# Patient Record
Sex: MALE | Race: WHITE
[De-identification: names, ages, dates, MRNs, and addresses within clinical notes are randomized per-mention and may not be internally consistent; named-entity substitution may affect disease eponyms.]

---

## 2017-09-02 ENCOUNTER — HOSPITAL ENCOUNTER (EMERGENCY)
Dept: HOSPITAL 83 - ED | Age: 32
Discharge: HOME | End: 2017-09-02
Payer: SELF-PAY

## 2017-09-02 VITALS — HEIGHT: 60 IN

## 2017-09-02 VITALS — SYSTOLIC BLOOD PRESSURE: 150 MMHG | DIASTOLIC BLOOD PRESSURE: 100 MMHG

## 2017-09-02 DIAGNOSIS — F17.200: ICD-10-CM

## 2017-09-02 DIAGNOSIS — L02.11: Primary | ICD-10-CM

## 2017-09-02 DIAGNOSIS — R03.0: ICD-10-CM

## 2018-09-19 ENCOUNTER — HOSPITAL ENCOUNTER (EMERGENCY)
Dept: HOSPITAL 83 - ED | Age: 33
Discharge: HOME | End: 2018-09-19
Payer: SELF-PAY

## 2018-09-19 VITALS — DIASTOLIC BLOOD PRESSURE: 88 MMHG | SYSTOLIC BLOOD PRESSURE: 145 MMHG

## 2018-09-19 VITALS — BODY MASS INDEX: 29.12 KG/M2 | WEIGHT: 215 LBS | HEIGHT: 71.97 IN

## 2018-09-19 DIAGNOSIS — H60.92: Primary | ICD-10-CM

## 2018-09-19 DIAGNOSIS — F17.200: ICD-10-CM

## 2019-01-06 ENCOUNTER — HOSPITAL ENCOUNTER (INPATIENT)
Dept: HOSPITAL 83 - ED | Age: 34
LOS: 1 days | Discharge: HOME | DRG: 309 | End: 2019-01-07
Attending: INTERNAL MEDICINE | Admitting: INTERNAL MEDICINE
Payer: COMMERCIAL

## 2019-01-06 VITALS — DIASTOLIC BLOOD PRESSURE: 81 MMHG

## 2019-01-06 VITALS — HEIGHT: 72 IN | WEIGHT: 201.13 LBS | BODY MASS INDEX: 27.24 KG/M2

## 2019-01-06 VITALS — DIASTOLIC BLOOD PRESSURE: 90 MMHG

## 2019-01-06 VITALS — DIASTOLIC BLOOD PRESSURE: 63 MMHG

## 2019-01-06 VITALS — SYSTOLIC BLOOD PRESSURE: 130 MMHG | DIASTOLIC BLOOD PRESSURE: 78 MMHG

## 2019-01-06 VITALS — SYSTOLIC BLOOD PRESSURE: 140 MMHG | DIASTOLIC BLOOD PRESSURE: 90 MMHG

## 2019-01-06 VITALS — DIASTOLIC BLOOD PRESSURE: 91 MMHG

## 2019-01-06 VITALS — DIASTOLIC BLOOD PRESSURE: 100 MMHG

## 2019-01-06 VITALS — DIASTOLIC BLOOD PRESSURE: 88 MMHG | SYSTOLIC BLOOD PRESSURE: 142 MMHG

## 2019-01-06 VITALS — DIASTOLIC BLOOD PRESSURE: 98 MMHG

## 2019-01-06 VITALS — DIASTOLIC BLOOD PRESSURE: 76 MMHG | SYSTOLIC BLOOD PRESSURE: 123 MMHG

## 2019-01-06 DIAGNOSIS — Z82.49: ICD-10-CM

## 2019-01-06 DIAGNOSIS — E87.2: ICD-10-CM

## 2019-01-06 DIAGNOSIS — Z71.6: ICD-10-CM

## 2019-01-06 DIAGNOSIS — R06.82: ICD-10-CM

## 2019-01-06 DIAGNOSIS — R00.0: Primary | ICD-10-CM

## 2019-01-06 DIAGNOSIS — Z83.6: ICD-10-CM

## 2019-01-06 DIAGNOSIS — Z82.61: ICD-10-CM

## 2019-01-06 DIAGNOSIS — I45.81: ICD-10-CM

## 2019-01-06 DIAGNOSIS — Z83.3: ICD-10-CM

## 2019-01-06 DIAGNOSIS — E87.6: ICD-10-CM

## 2019-01-06 DIAGNOSIS — E87.3: ICD-10-CM

## 2019-01-06 DIAGNOSIS — R07.89: ICD-10-CM

## 2019-01-06 DIAGNOSIS — F17.200: ICD-10-CM

## 2019-01-06 LAB
ALBUMIN SERPL-MCNC: 4.1 GM/DL (ref 3.1–4.5)
ALP SERPL-CCNC: 107 U/L (ref 45–117)
ALT SERPL W P-5'-P-CCNC: 49 U/L (ref 12–78)
AMPHETAMINES UR QL SCN: < 1000
APAP SERPL-MCNC: < 5 UG/ML (ref 10–30)
APPEARANCE UR: (no result)
APTT PPP: 24 SECONDS (ref 20.8–31.5)
AST SERPL-CCNC: 22 IU/L (ref 3–35)
BACTERIA #/AREA URNS HPF: (no result) /[HPF]
BARBITURATES UR QL SCN: < 200
BASE EXCESS BLDA CALC-SCNC: -1.7 MMOL/L (ref -2–2)
BASOPHILS # BLD AUTO: 0.1 10*3/UL (ref 0–0.1)
BASOPHILS NFR BLD AUTO: 0.5 % (ref 0–1)
BENZODIAZ UR QL SCN: < 200
BILIRUB UR QL STRIP: NEGATIVE
BUN SERPL-MCNC: 13 MG/DL (ref 7–24)
BZE UR QL SCN: < 300
CANNABINOIDS UR QL SCN: < 50
CASTS URNS QL MICRO: (no result)
CHLORIDE SERPL-SCNC: 107 MMOL/L (ref 98–107)
COLOR UR: YELLOW
CREAT SERPL-MCNC: 1.09 MG/DL (ref 0.7–1.3)
EOSINOPHIL # BLD AUTO: 0.3 10*3/UL (ref 0–0.4)
EOSINOPHIL # BLD AUTO: 3.5 % (ref 1–4)
EPI CELLS #/AREA URNS HPF: (no result) /[HPF]
ERYTHROCYTE [DISTWIDTH] IN BLOOD BY AUTOMATED COUNT: 12.6 % (ref 0–14.5)
ETHANOL SERPL-MCNC: < 3 MG/DL (ref ?–3)
GLUCOSE UR QL: NEGATIVE
HCO3 BLDA-SCNC: 19.3 MMOL/L (ref 22–26)
HCT VFR BLD AUTO: 46.4 % (ref 42–52)
HGB BLD-MCNC: 16 G/DL (ref 14–18)
HGB UR QL STRIP: NEGATIVE
INR BLD: 0.9 (ref 2–3.5)
KETONES UR QL STRIP: NEGATIVE
LEUKOCYTE ESTERASE UR QL STRIP: NEGATIVE
LYMPHOCYTES # BLD AUTO: 2.7 10*3/UL (ref 1.3–4.4)
LYMPHOCYTES NFR BLD AUTO: 27.3 % (ref 27–41)
MCH RBC QN AUTO: 32.3 PG (ref 27–31)
MCHC RBC AUTO-ENTMCNC: 34.5 G/DL (ref 33–37)
MCV RBC AUTO: 93.7 FL (ref 80–94)
METHADONE UR QL SCN: < 300
MONOCYTES # BLD AUTO: 0.5 10*3/UL (ref 0.1–1)
MONOCYTES NFR BLD MANUAL: 5.3 % (ref 3–9)
NEUT #: 6.1 10*3/UL (ref 2.3–7.9)
NEUT %: 63.3 % (ref 47–73)
NITRITE UR QL STRIP: NEGATIVE
NRBC BLD QL AUTO: 0 10*3/UL (ref 0–0)
OPIATES UR QL SCN: < 300
PCO2 BLDA: 24.6 MMHG (ref 35–45)
PCP UR QL SCN: <  25
PH BLDA: 7.5 [PH] (ref 7.35–7.45)
PH UR STRIP: 6 [PH] (ref 5–9)
PLATELET # BLD AUTO: 219 10*3/UL (ref 130–400)
PMV BLD AUTO: 10.3 FL (ref 9.6–12.3)
PO2 BLDA: 168 MMHG (ref 80–90)
POTASSIUM SERPL-SCNC: 3 MMOL/L (ref 3.5–5.1)
PROT SERPL-MCNC: 7.7 GM/DL (ref 6.4–8.2)
RBC # BLD AUTO: 4.95 10*6/UL (ref 4.5–5.9)
RBC #/AREA URNS HPF: (no result) RBC/HPF (ref 0–2)
SAO2 % BLDA: 98.5 % (ref 95–97)
SODIUM SERPL-SCNC: 139 MMOL/L (ref 136–145)
SP GR UR: 1.02 (ref 1–1.03)
TROPONIN I SERPL-MCNC: < 0.015 NG/ML (ref ?–0.04)
UROBILINOGEN UR STRIP-MCNC: 0.2 E.U./DL (ref 0.2–1)
WBC #/AREA URNS HPF: (no result) WBC/HPF (ref 0–5)
WBC NRBC COR # BLD AUTO: 9.7 10*3/UL (ref 4.8–10.8)

## 2019-01-06 NOTE — NUR
A 33, admitted to ICCU, under the
services of ARTEM Rae DO with a diagnosis of PROLONGED QT
INTERVAL,TACHYCARDIA.
Chief complaint is PALPITATIONS, INTERMIT CHEST PAIN.
Patient arrived via stretcher from ER.
Monitor applied. Initial assessment completed.
Vital signs taken and recorded.
ARTEM RAE DO notified of admission to the unit.
Orders received.
See assessment for past medical history, medications
and allergies.
Patient and/or family oriented to unit. [facility]
visitation policy reviewed.
Clothing/patient valuable form completed.
 
JOHNNA SIERRA

## 2019-01-06 NOTE — EKG
[place], [place]
 
                               ELECTROCARDIOGRAM REPORT
 
        NAME: ILIA GASPAR                     ACCT #: [id number]  
        UNIT #: [id number]                        ROOM: [facility]    
        DOCTOR: SHERIDAN DRAFT REPORT          BIRTHDATE: [birth year]
 
 
 

 
 
                           [facility]
                                       
Test Date:    2019               Test Time:    04:14:09
Pat Name:     ILIA GASPAR             Department:   
Patient ID:   [id number]             Room:         [facility]
Gender:       M                        Technician:   Charity Marquez
:          1985               Requested By: BERKLEY HOPE
Order Number: [id number]      Reading MD:   Nathan Lee MD
                                 Measurements
Intervals                              Axis          
Rate:         126                      P:            57
FL:           130                      QRS:          91
QRSD:         98                       T:            23
QT:           281                                    
QTc:          418                                    
                           Interpretive Statements
Sinus tachycardia
Borderline right axis deviation
Poor precordial R-wave progression
 
Electronically Signed On 2019 10:08:58 PST by Nathan Lee MD
 
CM:EKGRPT:ELECTROCARDIOGRAM REPORT
 
D: 19 0414
T: 19 1008
    
BERKLEY ALICEA DRAFT REPORT         
BERKLEY HOPE DO

## 2019-01-06 NOTE — PR
[place], [place]
 
                                      PROGRESS NOTE
 
        NAME: ILIA GASPAR                    [facility] #: [id number]  
        UNIT #: [id number]                       ROOM: [facility]    
        DOCTOR: NAJMA FERNANDEZ MD            BIRTHDATE: [birth year]
 
 
DOS: 01/07/2019
 
CARDIOLOGY PROGRESS NOTE
 
SUBJECTIVE:  The patient was seen in the Cardiology Department today,
01/07/2019, prior to his stress test.  He is a 33-year-old man who came in to
the hospital because of palpitations and tachycardia.  He has ruled out for
myocardial infarction.  Since he was admitted, he was found to have low
potassium and this has been replaced.  Serial cardiac biomarkers have been
normal and his electrocardiogram showed no acute changes.
 
PHYSICAL EXAMINATION:  On exam today, his pulse is 92 and regular, blood
pressure is 113/77.  He is afebrile.  He weighs 91.2 kg and has a body mass
index 27.3.  HEENT, normocephalic and atraumatic.  Extraocular muscles are
intact.  Sclerae are clear.  Pupils are equal, round and react to light.  The
oral mucosa is moist.  Tongue is midline.  His neck is supple.  He has no
jugular distention.  Carotids are full.  There are no bruits.  He has no neck or
supraclavicular masses and no thyromegaly.  Respirations are unlabored.  His
chest is clear to auscultation and percussion.  He has no presacral edema or
chest wall tenderness.  His heart has a regular rhythm without murmurs, rubs or
gallops.  Abdomen is benign.  Extremities showed no edema.
 
LABORATORY DATA:  His TSH was normal this morning at 1.250, free T4 is normal at
0.86.
 
IMPRESSIONS:
1.  Sinus tachycardia.
2.  Atypical chest pain with increased cardiac awareness.
 
PLAN:  We will proceed with an exercise stress test today along with an
echocardiogram.  If those are normal, then no other cardiac workup would be
indicated.  The patient certainly does have resting tachycardia, which may be
due to inappropriate sinus tachycardia.  Anxiety probably does contribute to
this.  A low-dose beta blocker may be helpful for his symptoms.  I did discuss
this with him at length and he has requested that we start him on a beta-blocker
therapy.  I will prescribe metoprolol succinate 25 mg per day and titrate up if
need be.
 
Further recommendations depend upon the results of his stress test and echo.
 
I thank the hospitalist physicians for asking our advice regarding his care.
 
 
 
 
                              [place], [place]
 
                                      PROGRESS NOTE
 
        NAME: ILIA GASPAR                    ACCT #: [id number]  
        UNIT #: [id number]                       ROOM: [facility]    
        DOCTOR: NAJMA FERNANDEZ MD            BIRTHDATE: [birth year]
 
 
_________________________________
NAJMA FERNANDEZ MD
 
CM:PNTRANS
 
D: 01/07/19 1257                 
T: 01/07/19 2100
             
                                                            
NAJMA FERNANDEZ MD            
                                                            
01/08/19
1000
                              
interface

## 2019-01-06 NOTE — NUR
PT HAD A PM SNACK OF PEANUT BUTTER WITH CRACKERS AND GINGER ALE.  HE IS
WITHOUT COMPLAINTS AND IS REMINDED OF NPO AFTER MIDNIGHT ORDER FOR TESTING
THIS COMING AM.  HE VERBALIZES UNDERSTANDING.

## 2019-01-06 NOTE — EKG
[place], [place]
 
                               ELECTROCARDIOGRAM REPORT
 
        NAME: ILIA GASPAR                     ACCT #: [id number]  
        UNIT #: [id number]                        ROOM: [facility]    
        DOCTOR: SHERIDAN DRAFT REPORT          BIRTHDATE: [birth year]
 
 
 

 
 
                           [facility]
                                       
Test Date:    2019               Test Time:    10:20:28
Pat Name:     ILIA GASPAR             Department:   
Patient ID:   [id number]             Room:         [facility]
Gender:       M                        Technician:   Enedina Kruse
:          1985               Requested By: BERKLEY HOPE
Order Number: [id number]      Reading MD:   Nathan Lee MD
                                 Measurements
Intervals                              Axis          
Rate:         84                       P:            20
FL:           141                      QRS:          79
QRSD:         91                       T:            27
QT:           351                                    
QTc:          415                                    
                           Interpretive Statements
Sinus rhythm
ST elev, probable normal early repol pattern
Compared to earlier ECG this date, rate is slower
 
Electronically Signed On 2019 10:11:33 PST by Nathan Lee MD
 
CM:EKGRPT:ELECTROCARDIOGRAM REPORT
 
D: 19 1020
T: 19 1011
    
BERKLEY ALICEA DRAFT REPORT         
BERKLEY HOPE DO

## 2019-01-06 NOTE — CON
[place], [place]
 
                                 REPORT OF CONSULTATION
 
        NAME: ILIA GASPAR                      ACCT #: [id number]  
        UNIT #: [id number]                         ROOM: [facility]    
        DOCTOR: NAJMA FERNANDEZ MD              BIRTHDATE: [birth year]
 
 
DOS: 01/06/2019
 
CARDIOLOGY CONSULTATION
 
REASON FOR CONSULTATION:  Palpitations and tachycardia.
 
HISTORY OF PRESENT ILLNESS:  The patient is a 33-year-old man who has no
previous history of heart disease.  He states that for the last 2 months, he has
noticed that his heartbeat is fast on occasion.  It began gradually.  He has
noticed this can happen at rest or after going to the bathroom.  He has felt
lightheaded, but has not had any syncope.  He denies any recent weight change,
focal weakness, or peripheral edema.  Last evening, he was playing videogames
when he had to go to the bathroom.  He moved his bowels and then when he got up,
his heart began to race and he felt tightness in his chest radiating into his
back and into his shoulders.  His arms felt heavy and his hands felt tingly. 
He, therefore, came into the Emergency Room.  He was noted to be in sinus
tachycardia.  The initial EKG was interpreted by the computer as showing a
prolonged QT interval; however, this was an error and when I remeasured the QT,
it was within normal limits with a QTc of about 415.  He was noted to be
hypokalemic and his potassium was replaced.  He now feels somewhat better, but
his heart rate is still rapid on occasion.
 
PAST MEDICAL HISTORY:  Essentially unremarkable.  He specifically denies history
of hypertension, diabetes, myocardial infarction, stroke, heart murmur or
rheumatic fever.  He has no history of anemia or blood loss.  He has no history
of fevers.  He is on no prescribed medications and denies the use of any illegal
substances at this time.
 
REVIEW OF SYSTEMS:  The patient denies diplopia or loss of vision.  He has had
some lightheadedness, but denies syncope.  He denies focal weakness.  He denies
seizures.  He denies fevers, chills, sweats or recent weight change.  He denies
heat or cold intolerance.  He denies nausea or vomiting.  He denies hemoptysis
or hematemesis.  He denies cough, fevers or chills.  He denies change in bowel
or bladder habits.  He denies blood in the stools or urine.  He denies any easy
bruising or abnormal bleeding.  He has not had any skin rashes.  He denies any
peripheral edema and denies any history of phlebitis or leg swelling.  He states
that his weight has increased about 50 pounds in the last 3-4 years.  He
attributes this to his job.  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
times without much activity.  He denies any peripheral edema.  The remainder of
review of systems is negative except as noted above.
 
FAMILY HISTORY:  There is diabetes and heart disease in grandparents, but his
parents are alive and well.  His mother does have osteoporosis and rheumatoid
arthritis, but there is no family history of early coronary disease,
hypertension or heart murmur.  He has a brother who is alive and well.
 
SOCIAL HISTORY:  The patient does smoke under a pack of cigarettes a day.  He
drinks alcohol, maybe one day a month.  He does not currently take any illegal
medications or drugs.  He works as a dealer in a [employer].
 
 
                              [place], [place]
 
                                 REPORT OF CONSULTATION
 
        NAME: ILIA GASPAR                      ACCT #: [id number]  
        UNIT #: [id number]                         ROOM: [facility]    
        DOCTOR: NAJMA FERNANDEZ MD              BIRTHDATE: [birth year]
 
 
PHYSICAL EXAMINATION:
GENERAL:  The patient is a well-nourished white male who is awake, alert and
oriented.
VITAL SIGNS:  Pulse is 92 and regular.  Blood pressure is 123/81.  He is
afebrile.  He weighs 91.2 kg and has a body mass index of 27.3.
HEENT:  Normocephalic and atraumatic.  Extraocular muscles are intact.  Sclerae
are clear.  Pupils are equal, round and reactive to light.  The oral mucosa is
moist.  Tongue is midline.
NECK:  Supple.  He has no jugular distention.  Carotids are full.  I heard no
bruits.  He had no neck or supraclavicular masses and no thyromegaly.
LUNGS:  Respirations are unlabored.
CHEST:  Clear to auscultation and percussion.  He has no presacral edema or
chest wall tenderness.
CARDIOVASCULAR:  His heart has a regular rhythm without murmurs, rubs or
gallops.  The PMI is not displaced.  There is no precordial heave, lift, or
thrill.
ABDOMEN:  Soft and normally active without masses, organomegaly or bruits.
EXTREMITIES:  Showed no clubbing, cyanosis or edema.  Peripheral pulses are
easily palpated in the feet bilaterally.  He has no Homans sign.
 
LABORATORY DATA:  I reviewed his electrocardiograms from admission.  He did have
sinus tachycardia, but no other arrhythmias.  His corrected QT interval on
admission was 415 and the elevated number initially reported was an error.  He
had no diagnostic ST or T-wave changes.  Chest x-ray was unremarkable and showed
normal cardiac silhouette.  Hemoglobin is 16, hematocrit 46.4.  There are 9700
white cells, 219,000 platelets.  ABGs showed a pH of 7.505, pCO2 of 24.6, pO2 of
168 and a bicarbonate of 19.3.  INR is 0.9.  Lactic acid at baseline was
elevated at 3, but upon repeat was 1.7.  Serial troponin levels have been
normal.  Sodium is 139, potassium was 3.0 on admission, chloride 107, CO2 of 21,
BUN 13, creatinine 1.09.  Magnesium was normal at 2.0.  Liver tests were all
normal.  TSH was ordered, but is not yet available.
 
The patient's aspirin (salicylate) level was low at 2.4.  Acetaminophen was low
at 5.0.  Urine drug screen was negative for all drugs of abuse.  Alcohol level
was unmeasurable.
 
IMPRESSIONS:
1.  Sinus tachycardia.  Typically, the heart is responding to some external
influence that causes this rhythm.  The patient does admit to consuming
excessive amounts of caffeine, but I see no other external cause thus far for
the tachycardia.  Specifically, he does not appear to be dehydrated, anemic or
infected.  Thyroid studies are, however, pending.
2.  Atypical chest pain, most likely due to his palpitations and increased
cardiac awareness.
3.  Report of prolonged QT interval.  This was an error and the patient does not
have QT prolongation.
 
PLAN:  The patient's potassium is being replaced.  We will continue to watch him
on a monitor, although he may move out of the Intensive Care Unit from my
perspective.  We will be getting an echocardiogram to rule out structural heart
 
                              [place], [place]
 
                                 REPORT OF CONSULTATION
 
        NAME: ILIA GASPAR                      ACCT #: [id number]  
        UNIT #: [id number]                         ROOM: [facility]    
        DOCTOR: NAJMA FERNANDEZ MD              BIRTHDATE: [birth year]
 
 
disease and an exercise myocardial perfusion study in order to rule out coronary
artery disease as a cause for his chest discomfort.  Further recommendations
will depend upon those studies.  We will also wait for the result of his thyroid
indices.
 
I thank the hospitalist physicians for asking our advice regarding the patient's
care.
 
 
 
_________________________________
NAJMA FERNANDEZ MD
 
CM:CONSTR:REPORT OF CONSULTATION
 
D: 01/06/19 1353   T: 01/07/19 01/07/19     1115                                          interface

## 2019-01-06 NOTE — EKG
[place], [place]
 
                               ELECTROCARDIOGRAM REPORT
 
        NAME: ILIA GASPAR                     ACCT #: [id number]  
        UNIT #: [id number]                        ROOM: [facility]    
        DOCTOR: SHERIDAN DRAFT REPORT          BIRTHDATE: [birth year]
 
 
 

 
 
                           [facility]
                                       
Test Date:    2019               Test Time:    06:54:35
Pat Name:     ILIA GASPAR             Department:   
Patient ID:   [id number]             Room:         [facility]
Gender:       M                        Technician:   Enedina Kruse
:          1985               Requested By: BERKLEY HOPE
Order Number: [id number]      Reading MD:   Nathan Lee MD
                                 Measurements
Intervals                              Axis          
Rate:         98                       P:            51
WA:           149                      QRS:          79
QRSD:         90                       T:            13
QT:           324                                    
QTc:          414                                    
                           Interpretive Statements
Sinus rhythm
Compared to earlier ECG this date, rate is slower
 
 
Electronically Signed On 2019 10:10:05 PST by Nathan Lee MD
 
CM:EKGRPT:ELECTROCARDIOGRAM REPORT
 
D: 19 0654
T: 19 1010
    
BERKLEY ALICEA DRAFT REPORT         
BERKLEY HOPE DO

## 2019-01-06 NOTE — NUR
PT BATHED SELF, WASHED HAIR, AND CHANGED CLOTHES.  NO COMPLAINTS OFFERED. NO
CARDIAC CHANGES ON MONITOR.  VSS.

## 2019-01-06 NOTE — NUR
THE PT IS CALM AT THIS TIME.  C/O FEELING WEAK AND TIRED.   HIS FAMILY IS AT
THE BEDISDE.  CALL LIGHT IS WITHIN REACH.   WILL CONTINUE TO MONITOR

## 2019-01-06 NOTE — NUR
[employer] [employer] WAS CALLED THEY ADVISED TO CHECK AN ASPRIN AND TYLENOL LEVEL. TO
DO THIS Q2H TO ESTABLISH A PEAK AND DECLINE LEVEL.  IF THE ASA COMES BACK AT 30
DECIMETERS OR HIGHER BICARP WILL NEED TO BE GIVEN.  
DR HOPE WAS NOTIFIED

## 2019-01-07 VITALS — SYSTOLIC BLOOD PRESSURE: 112 MMHG | DIASTOLIC BLOOD PRESSURE: 76 MMHG

## 2019-01-07 VITALS — DIASTOLIC BLOOD PRESSURE: 77 MMHG | SYSTOLIC BLOOD PRESSURE: 122 MMHG

## 2019-01-07 VITALS — DIASTOLIC BLOOD PRESSURE: 77 MMHG

## 2019-01-07 LAB
25(OH)D3 SERPL-MCNC: 34.5 NG/ML (ref 30–100)
BASOPHILS # BLD AUTO: 0 10*3/UL (ref 0–0.1)
BASOPHILS NFR BLD AUTO: 0.5 % (ref 0–1)
BUN SERPL-MCNC: 13 MG/DL (ref 7–24)
CHLORIDE SERPL-SCNC: 110 MMOL/L (ref 98–107)
CHOLEST SERPL-MCNC: 198 MG/DL (ref ?–200)
CREAT SERPL-MCNC: 1.11 MG/DL (ref 0.7–1.3)
EOSINOPHIL # BLD AUTO: 0.3 10*3/UL (ref 0–0.4)
EOSINOPHIL # BLD AUTO: 4.2 % (ref 1–4)
ERYTHROCYTE [DISTWIDTH] IN BLOOD BY AUTOMATED COUNT: 13 % (ref 0–14.5)
HCT VFR BLD AUTO: 45.3 % (ref 42–52)
HDLC SERPL-MCNC: 26 MG/DL (ref 40–60)
HGB BLD-MCNC: 14.9 G/DL (ref 14–18)
LDLC SERPL DIRECT ASSAY-MCNC: 125 MG/DL (ref 9–159)
LV EF: 71 %
LVEF MODALITY: NORMAL
LYMPHOCYTES # BLD AUTO: 2.8 10*3/UL (ref 1.3–4.4)
LYMPHOCYTES NFR BLD AUTO: 34.2 % (ref 27–41)
MCH RBC QN AUTO: 32.1 PG (ref 27–31)
MCHC RBC AUTO-ENTMCNC: 32.9 G/DL (ref 33–37)
MCV RBC AUTO: 97.6 FL (ref 80–94)
MONOCYTES # BLD AUTO: 0.4 10*3/UL (ref 0.1–1)
MONOCYTES NFR BLD MANUAL: 4.9 % (ref 3–9)
NEUT #: 4.5 10*3/UL (ref 2.3–7.9)
NEUT %: 56 % (ref 47–73)
NRBC BLD QL AUTO: 0 % (ref 0–0)
PHOSPHATE SERPL-MCNC: 4.5 MG/DL (ref 2.5–4.9)
PLATELET # BLD AUTO: 190 10*3/UL (ref 130–400)
PMV BLD AUTO: 10.3 FL (ref 9.6–12.3)
POTASSIUM SERPL-SCNC: 4.4 MMOL/L (ref 3.5–5.1)
RBC # BLD AUTO: 4.64 10*6/UL (ref 4.5–5.9)
SODIUM SERPL-SCNC: 143 MMOL/L (ref 136–145)
T4 FREE SERPL-MCNC: 0.86 NG/DL (ref 0.76–1.46)
TRIGL SERPL-MCNC: 237 MG/DL (ref ?–150)
TSH SERPL DL<=0.005 MIU/L-ACNC: 1.25 UIU/ML (ref 0.36–4.75)
VITAMIN B12: 668 PG/ML (ref 247–911)
VLDLC SERPL CALC-MCNC: 47 MG/DL (ref 6–40)
WBC NRBC COR # BLD AUTO: 8.1 10*3/UL (ref 4.8–10.8)

## 2019-01-07 NOTE — NUR
in to talk to patient.
Patient states lives at home with his girlfriend and his mother.
There are 14 steps in the home.
Physician: no family physician
Pharmacy: [facility]
Home health services: none
Patient's level of ADLs: INDEPENDENT
Patient has working utilities: yes
DME: none
Follow-up physician's appointment after d/c: will be made by the hospitalist
nurse director upon discharge
Does patient want to access PORTAL?: no
Discharge plan discussed with patient. He lives at home with his girlfriend and
his mother. He is independent in his ADLs and ambulation. Discussed home health
care services and he denies any home needs at this time. When medically stable
he will be discharged to home.
 
SHADY PRASAD

## 2019-01-07 NOTE — NUR
INFORMED CONSENT SIGNED FOR CARDIOLYTE STRESS TEST WITH DR. FERNANDEZ.
RESTING EKG NSR, HR 92, /80.  COMPLETED 6:15 OF A STANDARD NEIL
PROTOCOL COMPLETING 3:15 STAGE II WHICH WAS DECREASED TO 2% GRADE AT 5:15
AFTER INJECTION OF CARDIOLYTE.  PEAK HEART RATE  ACHIEVED WHICH IS 88%
PREDICTED MAXIMUM AND A PEAK BP /62.  TEST TERMINATED D/T FATIGUE.  HAS
A FAIR EXERCISE TOLERANCE.  NO ARRHYTHMIAS NOTED WITH NONDIAGNOSTIC ST CHANGES
PRESENT.  LAST RECOVERY , /78.  WAITING NUCLEAR SCANNING IN STABLE
CONDITION.

## 2019-01-15 ENCOUNTER — HOSPITAL ENCOUNTER (OUTPATIENT)
Dept: HOSPITAL 83 - RESCLI | Age: 34
Discharge: HOME | End: 2019-01-15
Attending: INTERNAL MEDICINE
Payer: SELF-PAY

## 2019-01-15 DIAGNOSIS — Z71.6: ICD-10-CM

## 2019-01-15 DIAGNOSIS — F41.1: Primary | ICD-10-CM

## 2019-01-15 DIAGNOSIS — Z76.89: ICD-10-CM

## 2019-01-15 DIAGNOSIS — R00.0: ICD-10-CM

## 2019-01-15 DIAGNOSIS — F17.210: ICD-10-CM

## 2019-01-17 ENCOUNTER — HOSPITAL ENCOUNTER (EMERGENCY)
Dept: HOSPITAL 83 - ED | Age: 34
Discharge: HOME | End: 2019-01-17
Payer: SELF-PAY

## 2019-01-17 VITALS — SYSTOLIC BLOOD PRESSURE: 162 MMHG | DIASTOLIC BLOOD PRESSURE: 88 MMHG

## 2019-01-17 VITALS — HEIGHT: 60 IN

## 2019-01-17 DIAGNOSIS — F41.0: Primary | ICD-10-CM

## 2019-01-17 DIAGNOSIS — Z79.899: ICD-10-CM

## 2019-01-17 DIAGNOSIS — F17.200: ICD-10-CM

## 2019-01-17 LAB
ALBUMIN SERPL-MCNC: 4.3 GM/DL (ref 3.1–4.5)
ALP SERPL-CCNC: 107 U/L (ref 45–117)
ALT SERPL W P-5'-P-CCNC: 46 U/L (ref 12–78)
AST SERPL-CCNC: 17 IU/L (ref 3–35)
BASOPHILS # BLD AUTO: 0 10*3/UL (ref 0–0.1)
BASOPHILS NFR BLD AUTO: 0.3 % (ref 0–1)
BUN SERPL-MCNC: 13 MG/DL (ref 7–24)
CHLORIDE SERPL-SCNC: 104 MMOL/L (ref 98–107)
CREAT SERPL-MCNC: 1.03 MG/DL (ref 0.7–1.3)
EOSINOPHIL # BLD AUTO: 0.2 10*3/UL (ref 0–0.4)
EOSINOPHIL # BLD AUTO: 2.1 % (ref 1–4)
ERYTHROCYTE [DISTWIDTH] IN BLOOD BY AUTOMATED COUNT: 12.2 % (ref 0–14.5)
HCT VFR BLD AUTO: 47.6 % (ref 42–52)
HGB BLD-MCNC: 16.5 G/DL (ref 14–18)
LYMPHOCYTES # BLD AUTO: 1.7 10*3/UL (ref 1.3–4.4)
LYMPHOCYTES NFR BLD AUTO: 14.3 % (ref 27–41)
MCH RBC QN AUTO: 32 PG (ref 27–31)
MCHC RBC AUTO-ENTMCNC: 34.7 G/DL (ref 33–37)
MCV RBC AUTO: 92.2 FL (ref 80–94)
MONOCYTES # BLD AUTO: 0.4 10*3/UL (ref 0.1–1)
MONOCYTES NFR BLD MANUAL: 3.8 % (ref 3–9)
NEUT #: 9.1 10*3/UL (ref 2.3–7.9)
NEUT %: 79.1 % (ref 47–73)
NRBC BLD QL AUTO: 0 10*3/UL (ref 0–0)
PLATELET # BLD AUTO: 213 10*3/UL (ref 130–400)
PMV BLD AUTO: 10 FL (ref 9.6–12.3)
POTASSIUM SERPL-SCNC: 3.3 MMOL/L (ref 3.5–5.1)
PROT SERPL-MCNC: 8.1 GM/DL (ref 6.4–8.2)
RBC # BLD AUTO: 5.16 10*6/UL (ref 4.5–5.9)
SODIUM SERPL-SCNC: 138 MMOL/L (ref 136–145)
TROPONIN I SERPL-MCNC: < 0.015 NG/ML (ref ?–0.04)
WBC NRBC COR # BLD AUTO: 11.6 10*3/UL (ref 4.8–10.8)

## 2019-02-21 ENCOUNTER — HOSPITAL ENCOUNTER (OUTPATIENT)
Dept: HOSPITAL 83 - RESCLI | Age: 34
Discharge: HOME | End: 2019-02-21
Attending: INTERNAL MEDICINE
Payer: SELF-PAY

## 2019-02-21 DIAGNOSIS — R00.0: ICD-10-CM

## 2019-02-21 DIAGNOSIS — Z88.8: ICD-10-CM

## 2019-02-21 DIAGNOSIS — Z79.899: ICD-10-CM

## 2019-02-21 DIAGNOSIS — I10: Primary | ICD-10-CM

## 2019-02-21 DIAGNOSIS — F41.1: ICD-10-CM

## 2022-08-24 ENCOUNTER — HOSPITAL ENCOUNTER (OUTPATIENT)
Dept: HOSPITAL 83 - COVID19 | Age: 37
Discharge: HOME | End: 2022-08-24
Attending: INTERNAL MEDICINE
Payer: COMMERCIAL

## 2022-08-24 DIAGNOSIS — Z11.52: Primary | ICD-10-CM
